# Patient Record
Sex: FEMALE | Race: WHITE | NOT HISPANIC OR LATINO | Employment: FULL TIME
[De-identification: names, ages, dates, MRNs, and addresses within clinical notes are randomized per-mention and may not be internally consistent; named-entity substitution may affect disease eponyms.]

---

## 2017-06-24 ENCOUNTER — HEALTH MAINTENANCE LETTER (OUTPATIENT)
Age: 42
End: 2017-06-24

## 2018-06-30 ENCOUNTER — HEALTH MAINTENANCE LETTER (OUTPATIENT)
Age: 43
End: 2018-06-30

## 2019-10-03 ENCOUNTER — HEALTH MAINTENANCE LETTER (OUTPATIENT)
Age: 44
End: 2019-10-03

## 2021-01-15 ENCOUNTER — HEALTH MAINTENANCE LETTER (OUTPATIENT)
Age: 46
End: 2021-01-15

## 2021-01-24 ENCOUNTER — HEALTH MAINTENANCE LETTER (OUTPATIENT)
Age: 46
End: 2021-01-24

## 2021-09-05 ENCOUNTER — HEALTH MAINTENANCE LETTER (OUTPATIENT)
Age: 46
End: 2021-09-05

## 2022-01-17 ENCOUNTER — HOSPITAL ENCOUNTER (EMERGENCY)
Facility: CLINIC | Age: 47
Discharge: HOME | End: 2022-01-17
Attending: EMERGENCY MEDICINE | Admitting: EMERGENCY MEDICINE
Payer: COMMERCIAL

## 2022-01-17 VITALS
SYSTOLIC BLOOD PRESSURE: 132 MMHG | HEART RATE: 70 BPM | OXYGEN SATURATION: 98 % | TEMPERATURE: 98.9 F | DIASTOLIC BLOOD PRESSURE: 71 MMHG | RESPIRATION RATE: 20 BRPM

## 2022-01-17 DIAGNOSIS — R00.2 PALPITATIONS: ICD-10-CM

## 2022-01-17 DIAGNOSIS — R07.89 CHEST HEAVINESS: ICD-10-CM

## 2022-01-17 LAB
ANION GAP SERPL CALCULATED.3IONS-SCNC: 3 MMOL/L (ref 3–14)
ATRIAL RATE - MUSE: 66 BPM
BASOPHILS # BLD AUTO: 0 10E3/UL (ref 0–0.2)
BASOPHILS NFR BLD AUTO: 1 %
BUN SERPL-MCNC: 20 MG/DL (ref 7–30)
CALCIUM SERPL-MCNC: 9 MG/DL (ref 8.5–10.1)
CHLORIDE BLD-SCNC: 106 MMOL/L (ref 94–109)
CO2 SERPL-SCNC: 27 MMOL/L (ref 20–32)
CREAT SERPL-MCNC: 0.62 MG/DL (ref 0.52–1.04)
D DIMER PPP FEU-MCNC: <0.27 UG/ML FEU (ref 0–0.5)
DIASTOLIC BLOOD PRESSURE - MUSE: NORMAL MMHG
EOSINOPHIL # BLD AUTO: 0.2 10E3/UL (ref 0–0.7)
EOSINOPHIL NFR BLD AUTO: 3 %
ERYTHROCYTE [DISTWIDTH] IN BLOOD BY AUTOMATED COUNT: 13.5 % (ref 10–15)
GFR SERPL CREATININE-BSD FRML MDRD: >90 ML/MIN/1.73M2
GLUCOSE BLD-MCNC: 118 MG/DL (ref 70–99)
HCT VFR BLD AUTO: 42 % (ref 35–47)
HGB BLD-MCNC: 13.8 G/DL (ref 11.7–15.7)
HOLD SPECIMEN: NORMAL
IMM GRANULOCYTES # BLD: 0 10E3/UL
IMM GRANULOCYTES NFR BLD: 0 %
INTERPRETATION ECG - MUSE: NORMAL
LYMPHOCYTES # BLD AUTO: 1.7 10E3/UL (ref 0.8–5.3)
LYMPHOCYTES NFR BLD AUTO: 36 %
MCH RBC QN AUTO: 30.5 PG (ref 26.5–33)
MCHC RBC AUTO-ENTMCNC: 32.9 G/DL (ref 31.5–36.5)
MCV RBC AUTO: 93 FL (ref 78–100)
MONOCYTES # BLD AUTO: 0.4 10E3/UL (ref 0–1.3)
MONOCYTES NFR BLD AUTO: 8 %
NEUTROPHILS # BLD AUTO: 2.6 10E3/UL (ref 1.6–8.3)
NEUTROPHILS NFR BLD AUTO: 52 %
NRBC # BLD AUTO: 0 10E3/UL
NRBC BLD AUTO-RTO: 0 /100
P AXIS - MUSE: 53 DEGREES
PLATELET # BLD AUTO: 200 10E3/UL (ref 150–450)
POTASSIUM BLD-SCNC: 4.1 MMOL/L (ref 3.4–5.3)
PR INTERVAL - MUSE: 158 MS
QRS DURATION - MUSE: 100 MS
QT - MUSE: 432 MS
QTC - MUSE: 452 MS
R AXIS - MUSE: 32 DEGREES
RBC # BLD AUTO: 4.52 10E6/UL (ref 3.8–5.2)
SODIUM SERPL-SCNC: 136 MMOL/L (ref 133–144)
SYSTOLIC BLOOD PRESSURE - MUSE: NORMAL MMHG
T AXIS - MUSE: 39 DEGREES
TROPONIN I SERPL HS-MCNC: 4 NG/L
VENTRICULAR RATE- MUSE: 66 BPM
WBC # BLD AUTO: 4.9 10E3/UL (ref 4–11)

## 2022-01-17 PROCEDURE — 85025 COMPLETE CBC W/AUTO DIFF WBC: CPT | Performed by: EMERGENCY MEDICINE

## 2022-01-17 PROCEDURE — 84484 ASSAY OF TROPONIN QUANT: CPT | Performed by: EMERGENCY MEDICINE

## 2022-01-17 PROCEDURE — 80048 BASIC METABOLIC PNL TOTAL CA: CPT | Performed by: EMERGENCY MEDICINE

## 2022-01-17 PROCEDURE — 85379 FIBRIN DEGRADATION QUANT: CPT | Performed by: EMERGENCY MEDICINE

## 2022-01-17 PROCEDURE — 36415 COLL VENOUS BLD VENIPUNCTURE: CPT | Performed by: EMERGENCY MEDICINE

## 2022-01-17 PROCEDURE — 93005 ELECTROCARDIOGRAM TRACING: CPT

## 2022-01-17 PROCEDURE — 99284 EMERGENCY DEPT VISIT MOD MDM: CPT

## 2022-01-17 RX ORDER — HYDROXYZINE HYDROCHLORIDE 25 MG/1
25-50 TABLET, FILM COATED ORAL 3 TIMES DAILY PRN
Qty: 30 TABLET | Refills: 0 | Status: SHIPPED | OUTPATIENT
Start: 2022-01-17

## 2022-01-17 ASSESSMENT — ENCOUNTER SYMPTOMS
MYALGIAS: 1
PALPITATIONS: 1

## 2022-01-17 NOTE — ED TRIAGE NOTES
Here for chest pain/pressure started Friday morning associated with palpitations, dull pain to right leg. Felt better Saturday, but symptoms return last night. Return from plane ride [place] trip last night. ABCs intact.

## 2022-01-17 NOTE — ED PROVIDER NOTES
History   Chief Complaint:  Palpitations       The history is provided by the patient and medical records.      Maria Esther Flores is a 46 year old female who presents with palpitations. Per triage note, the patient reports that she began experiencing palpitations after accidentally drinking 3 or 4 energy drinks about 4 days ago. She states that her symptoms briefly resolved before returning the next day, along with chest pain and right leg soreness. The patient endorses difficulty sleeping due to her symptoms, noting that she sometimes wakes up gasping for air. Of note, she reports that her brother had a history of cardiac issues. She reportedly takes regular aspirin and cholecalciferol. She states that she began taking LifePak, a nutritional supplement, about 3 days ago. She returned from a trip to [place] last night. The patient also notes that she had Covid-19 about 2 months ago. She endorses recent alcohol use. She is not a smoker.    Review of Systems   Cardiovascular: Positive for chest pain and palpitations.   Musculoskeletal: Positive for myalgias (R leg).   All other systems reviewed and are negative.      Allergies:  Penicillins     Medications:  Aspirin  Cholecalciferol    Past Medical History:     Atypical nevus  Irregular menstrual bleeding  Pyelonephritis      Past Surgical History:    LEEP procedure     Family History:    Father: Arthritis, CAD, prostate cancer, heart disease  Mother: Arthritis, asthma, allergies, breast cancer, CAD, heart disease, macular degeneration, hysterectomy, lipid issue, osteoporosis   Brother: CAD, heart disease, asthma, thyroid disease    Social History:  Presents to the emergency department alone   Arrives via car   Patient is   Patient is not a smoker    Physical Exam     Patient Vitals for the past 24 hrs:   BP Temp Temp src Pulse Resp SpO2   01/17/22 0212 (!) 151/107 98.9  F (37.2  C) Temporal 77 18 97 %       Physical Exam  Nursing note and vitals  reviewed.  HENT:   Mouth/Throat: Moist mucous membranes.   Eyes: EOMI, nonicteric sclera  Cardiovascular: Normal rate, regular rhythm, no murmurs, rubs, or gallops  Pulmonary/Chest: Effort normal and breath sounds normal. No respiratory distress. No wheezes. No rales.   Abdominal: Soft. Nontender, nondistended, no guarding or rigidity.   Musculoskeletal: Normal range of motion.   Neurological: Alert. Moves all extremities spontaneously.   Skin: Skin is warm and dry. No rash noted.   Psychiatric: Normal mood and affect.    Emergency Department Course   ECG  ECG obtained at 0222, ECG read at 0427  Normal sinus rhythm  Incomplete right bundle branch block    Rate 66 bpm. MO interval 158 ms. QRS duration 100 ms. QT/QTc 432/452 ms. P-R-T axes 53 32 39.     Laboratory:  Labs Ordered and Resulted from Time of ED Arrival to Time of ED Departure   BASIC METABOLIC PANEL - Abnormal       Result Value    Sodium 136      Potassium 4.1      Chloride 106      Carbon Dioxide (CO2) 27      Anion Gap 3      Urea Nitrogen 20      Creatinine 0.62      Calcium 9.0      Glucose 118 (*)     GFR Estimate >90     TROPONIN I - Normal    Troponin I High Sensitivity 4     D DIMER QUANTITATIVE - Normal    D-Dimer Quantitative <0.27     CBC WITH PLATELETS AND DIFFERENTIAL    WBC Count 4.9      RBC Count 4.52      Hemoglobin 13.8      Hematocrit 42.0      MCV 93      MCH 30.5      MCHC 32.9      RDW 13.5      Platelet Count 200      % Neutrophils 52      % Lymphocytes 36      % Monocytes 8      % Eosinophils 3      % Basophils 1      % Immature Granulocytes 0      NRBCs per 100 WBC 0      Absolute Neutrophils 2.6      Absolute Lymphocytes 1.7      Absolute Monocytes 0.4      Absolute Eosinophils 0.2      Absolute Basophils 0.0      Absolute Immature Granulocytes 0.0      Absolute NRBCs 0.0        Emergency Department Course:  Reviewed:  I reviewed nursing notes, vitals, past medical history and Care Everywhere    Assessments:  0427 I obtained  history and examined the patient as noted above.     Disposition:  The patient was discharged to home.     Impression & Plan     Medical Decision Making:  Pt presents with CC palpitations, chest heaviness. Pt recently returned from trip to [place]. Broad ddx considered. Fortunately, ED workup is negative for acute/emergent etiology including ACS/PE, etc.. Given symptoms began after starting a supplement, I advised her to stop the supplement. Additionally, she had a caffeine overdose several days ago which may still be affecting her in some way. She's been having difficulty sleeping - waking up gasping for air reminiscent of anxiety as well. Discussed trialing hydroxyzine before going to bed for both help with sleeping as well as potential anxiety response. Also advised close pcp follow-up for additional evaluation which may include a heart monitor if symptoms persist. She is in stable condition at the time of discharge, indications for return to the ED were discussed as well as follow up. All questions were answered and she is in agreement with the plan.       Diagnosis:    ICD-10-CM    1. Palpitations  R00.2    2. Chest heaviness  R07.89        Discharge Medications:  New Prescriptions    HYDROXYZINE (ATARAX) 25 MG TABLET    Take 1-2 tablets (25-50 mg) by mouth 3 times daily as needed for anxiety (sleep)       Scribe Disclosure:  I, Santos Funes, am serving as a scribe at 4:11 AM on 1/17/2022 to document services personally performed by Jaxson Carrizales MD based on my observations and the provider's statements to me.              Jaxson Carrizales MD  01/18/22 0132

## 2022-02-20 ENCOUNTER — HEALTH MAINTENANCE LETTER (OUTPATIENT)
Age: 47
End: 2022-02-20

## 2022-10-23 ENCOUNTER — HEALTH MAINTENANCE LETTER (OUTPATIENT)
Age: 47
End: 2022-10-23

## 2023-04-02 ENCOUNTER — HEALTH MAINTENANCE LETTER (OUTPATIENT)
Age: 48
End: 2023-04-02

## 2023-11-05 ENCOUNTER — HEALTH MAINTENANCE LETTER (OUTPATIENT)
Age: 48
End: 2023-11-05

## 2024-12-22 ENCOUNTER — HEALTH MAINTENANCE LETTER (OUTPATIENT)
Age: 49
End: 2024-12-22